# Patient Record
Sex: MALE | Race: OTHER
[De-identification: names, ages, dates, MRNs, and addresses within clinical notes are randomized per-mention and may not be internally consistent; named-entity substitution may affect disease eponyms.]

---

## 2019-12-29 ENCOUNTER — HOSPITAL ENCOUNTER (EMERGENCY)
Dept: HOSPITAL 8 - ED | Age: 44
Discharge: HOME | End: 2019-12-29
Payer: COMMERCIAL

## 2019-12-29 VITALS — WEIGHT: 165.35 LBS | BODY MASS INDEX: 24.49 KG/M2 | HEIGHT: 69 IN

## 2019-12-29 VITALS — DIASTOLIC BLOOD PRESSURE: 78 MMHG | SYSTOLIC BLOOD PRESSURE: 126 MMHG

## 2019-12-29 DIAGNOSIS — R20.0: Primary | ICD-10-CM

## 2019-12-29 DIAGNOSIS — G43.909: ICD-10-CM

## 2019-12-29 LAB
ANION GAP SERPL CALC-SCNC: 5 MMOL/L (ref 5–15)
BASOPHILS # BLD AUTO: 0.02 X10^3/UL (ref 0–0.1)
BASOPHILS NFR BLD AUTO: 0 % (ref 0–1)
CALCIUM SERPL-MCNC: 9.4 MG/DL (ref 8.5–10.1)
CHLORIDE SERPL-SCNC: 108 MMOL/L (ref 98–107)
CREAT SERPL-MCNC: 1.2 MG/DL (ref 0.7–1.3)
EOSINOPHIL # BLD AUTO: 0.05 X10^3/UL (ref 0–0.4)
EOSINOPHIL NFR BLD AUTO: 1 % (ref 1–7)
ERYTHROCYTE [DISTWIDTH] IN BLOOD BY AUTOMATED COUNT: 14.4 % (ref 9.4–14.8)
LYMPHOCYTES # BLD AUTO: 1.36 X10^3/UL (ref 1–3.4)
LYMPHOCYTES NFR BLD AUTO: 19 % (ref 22–44)
MCH RBC QN AUTO: 28.8 PG (ref 27.5–34.5)
MCHC RBC AUTO-ENTMCNC: 33.3 G/DL (ref 33.2–36.2)
MCV RBC AUTO: 86.6 FL (ref 81–97)
MD: NO
MONOCYTES # BLD AUTO: 0.67 X10^3/UL (ref 0.2–0.8)
MONOCYTES NFR BLD AUTO: 9 % (ref 2–9)
NEUTROPHILS # BLD AUTO: 5.21 X10^3/UL (ref 1.8–6.8)
NEUTROPHILS NFR BLD AUTO: 71 % (ref 42–75)
PLATELET # BLD AUTO: 283 X10^3/UL (ref 130–400)
PMV BLD AUTO: 8 FL (ref 7.4–10.4)
RBC # BLD AUTO: 5.45 X10^6/UL (ref 4.38–5.82)

## 2019-12-29 PROCEDURE — 70450 CT HEAD/BRAIN W/O DYE: CPT

## 2019-12-29 PROCEDURE — 99284 EMERGENCY DEPT VISIT MOD MDM: CPT

## 2019-12-29 PROCEDURE — 70496 CT ANGIOGRAPHY HEAD: CPT

## 2019-12-29 PROCEDURE — 83735 ASSAY OF MAGNESIUM: CPT

## 2019-12-29 PROCEDURE — 70498 CT ANGIOGRAPHY NECK: CPT

## 2019-12-29 PROCEDURE — 93005 ELECTROCARDIOGRAM TRACING: CPT

## 2019-12-29 PROCEDURE — 85025 COMPLETE CBC W/AUTO DIFF WBC: CPT

## 2019-12-29 PROCEDURE — 80048 BASIC METABOLIC PNL TOTAL CA: CPT

## 2019-12-29 PROCEDURE — 36415 COLL VENOUS BLD VENIPUNCTURE: CPT

## 2019-12-29 NOTE — NUR
Patient ambulated from [facility] to [facility]. Patient reports 
having left arm numbness and lightheadedness earlier today, but these symptoms 
have resolved. Patient ekg completed, assessed by provider, orders placed and 
patient ambulated steadily to bathroom.

## 2019-12-29 NOTE — NUR
RN returned to bedside, attached to cardiac monitor and educated on reasons. 
Patient verbalized understanding reports still remains symptom free at this 
time. New order placed for imaging. Imaging technician to bedside now. Awaiting 
CT and read.